# Patient Record
Sex: FEMALE | Race: ASIAN | NOT HISPANIC OR LATINO | Employment: FULL TIME | URBAN - METROPOLITAN AREA
[De-identification: names, ages, dates, MRNs, and addresses within clinical notes are randomized per-mention and may not be internally consistent; named-entity substitution may affect disease eponyms.]

---

## 2018-01-19 ENCOUNTER — RECORDS - HEALTHEAST (OUTPATIENT)
Dept: LAB | Facility: CLINIC | Age: 28
End: 2018-01-19

## 2018-01-19 LAB
BASOPHILS # BLD AUTO: 0 THOU/UL (ref 0–0.2)
BASOPHILS NFR BLD AUTO: 0 % (ref 0–2)
EOSINOPHIL # BLD AUTO: 0.2 THOU/UL (ref 0–0.4)
EOSINOPHIL NFR BLD AUTO: 2 % (ref 0–6)
ERYTHROCYTE [DISTWIDTH] IN BLOOD BY AUTOMATED COUNT: 13.3 % (ref 11–14.5)
HCT VFR BLD AUTO: 32.5 % (ref 35–47)
HGB BLD-MCNC: 11.1 G/DL (ref 12–16)
HIV 1+2 AB+HIV1 P24 AG SERPL QL IA: NEGATIVE
LYMPHOCYTES # BLD AUTO: 2.2 THOU/UL (ref 0.8–4.4)
LYMPHOCYTES NFR BLD AUTO: 22 % (ref 20–40)
MCH RBC QN AUTO: 30.2 PG (ref 27–34)
MCHC RBC AUTO-ENTMCNC: 34.2 G/DL (ref 32–36)
MCV RBC AUTO: 89 FL (ref 80–100)
MONOCYTES # BLD AUTO: 0.4 THOU/UL (ref 0–0.9)
MONOCYTES NFR BLD AUTO: 4 % (ref 2–10)
NEUTROPHILS # BLD AUTO: 7.1 THOU/UL (ref 2–7.7)
NEUTROPHILS NFR BLD AUTO: 71 % (ref 50–70)
PLATELET # BLD AUTO: 246 THOU/UL (ref 140–440)
PMV BLD AUTO: 11.1 FL (ref 8.5–12.5)
RBC # BLD AUTO: 3.67 MILL/UL (ref 3.8–5.4)
WBC: 10.1 THOU/UL (ref 4–11)

## 2018-01-20 LAB
ANTIBODY SCREEN: NEGATIVE
BACTERIA SPEC CULT: NO GROWTH
HBV SURFACE AG SERPL QL IA: NEGATIVE

## 2018-01-21 ENCOUNTER — HEALTH MAINTENANCE LETTER (OUTPATIENT)
Age: 28
End: 2018-01-21

## 2018-01-22 LAB
ABO/RH(D): NORMAL
ABORH REPEAT: NORMAL
C TRACH DNA SPEC QL PROBE+SIG AMP: NEGATIVE
N GONORRHOEA DNA SPEC QL NAA+PROBE: NEGATIVE
RUBV IGG SERPL QL IA: NORMAL
SYPHILIS RPR SCREEN - HISTORICAL: NORMAL

## 2018-03-12 ENCOUNTER — RECORDS - HEALTHEAST (OUTPATIENT)
Dept: ADMINISTRATIVE | Facility: OTHER | Age: 28
End: 2018-03-12

## 2018-05-09 ENCOUNTER — RECORDS - HEALTHEAST (OUTPATIENT)
Dept: LAB | Facility: CLINIC | Age: 28
End: 2018-05-09

## 2018-05-10 LAB
ALLERGIC TO PENICILLIN: NO
GP B STREP DNA SPEC QL NAA+PROBE: NEGATIVE

## 2020-08-04 ENCOUNTER — COMMUNICATION - HEALTHEAST (OUTPATIENT)
Dept: SCHEDULING | Facility: CLINIC | Age: 30
End: 2020-08-04

## 2021-06-03 ENCOUNTER — RECORDS - HEALTHEAST (OUTPATIENT)
Dept: ADMINISTRATIVE | Facility: CLINIC | Age: 31
End: 2021-06-03

## 2021-07-14 PROBLEM — Z34.90 PREGNANT: Status: RESOLVED | Noted: 2018-05-31 | Resolved: 2018-06-01

## 2022-11-14 ENCOUNTER — LAB REQUISITION (OUTPATIENT)
Dept: LAB | Facility: CLINIC | Age: 32
End: 2022-11-14

## 2022-11-14 DIAGNOSIS — Z00.00 ENCOUNTER FOR GENERAL ADULT MEDICAL EXAMINATION WITHOUT ABNORMAL FINDINGS: ICD-10-CM

## 2022-11-14 DIAGNOSIS — Z01.419 ENCOUNTER FOR GYNECOLOGICAL EXAMINATION (GENERAL) (ROUTINE) WITHOUT ABNORMAL FINDINGS: ICD-10-CM

## 2022-11-14 PROCEDURE — 80061 LIPID PANEL: CPT | Performed by: FAMILY MEDICINE

## 2022-11-14 PROCEDURE — 87624 HPV HI-RISK TYP POOLED RSLT: CPT | Performed by: FAMILY MEDICINE

## 2022-11-14 PROCEDURE — G0145 SCR C/V CYTO,THINLAYER,RESCR: HCPCS | Performed by: FAMILY MEDICINE

## 2022-11-15 LAB
CHOLEST SERPL-MCNC: 142 MG/DL
HDLC SERPL-MCNC: 52 MG/DL
LDLC SERPL CALC-MCNC: 80 MG/DL
NONHDLC SERPL-MCNC: 90 MG/DL
TRIGL SERPL-MCNC: 51 MG/DL

## 2022-11-17 LAB
BKR LAB AP GYN ADEQUACY: NORMAL
BKR LAB AP GYN INTERPRETATION: NORMAL
BKR LAB AP HPV REFLEX: NORMAL
BKR LAB AP LMP: NORMAL
BKR LAB AP PREVIOUS ABNORMAL: NORMAL
PATH REPORT.COMMENTS IMP SPEC: NORMAL
PATH REPORT.COMMENTS IMP SPEC: NORMAL
PATH REPORT.RELEVANT HX SPEC: NORMAL

## 2022-11-21 LAB
HUMAN PAPILLOMA VIRUS 16 DNA: NEGATIVE
HUMAN PAPILLOMA VIRUS 18 DNA: NEGATIVE
HUMAN PAPILLOMA VIRUS FINAL DIAGNOSIS: NORMAL
HUMAN PAPILLOMA VIRUS OTHER HR: NEGATIVE

## 2023-03-22 ENCOUNTER — LAB REQUISITION (OUTPATIENT)
Dept: LAB | Facility: CLINIC | Age: 33
End: 2023-03-22

## 2023-03-22 DIAGNOSIS — O20.0 THREATENED ABORTION: ICD-10-CM

## 2023-03-22 LAB — HCG INTACT+B SERPL-ACNC: 7 MIU/ML

## 2023-03-22 PROCEDURE — 84702 CHORIONIC GONADOTROPIN TEST: CPT | Performed by: NURSE PRACTITIONER

## 2024-06-11 ENCOUNTER — HOSPITAL ENCOUNTER (EMERGENCY)
Facility: HOSPITAL | Age: 34
Discharge: HOME | End: 2024-06-11
Attending: EMERGENCY MEDICINE | Admitting: EMERGENCY MEDICINE
Payer: COMMERCIAL

## 2024-06-11 VITALS
WEIGHT: 128.8 LBS | TEMPERATURE: 98.1 F | OXYGEN SATURATION: 100 % | DIASTOLIC BLOOD PRESSURE: 62 MMHG | HEIGHT: 62 IN | SYSTOLIC BLOOD PRESSURE: 98 MMHG | HEART RATE: 58 BPM | BODY MASS INDEX: 23.7 KG/M2 | RESPIRATION RATE: 18 BRPM

## 2024-06-11 DIAGNOSIS — L29.9 ITCHING: ICD-10-CM

## 2024-06-11 PROCEDURE — 250N000012 HC RX MED GY IP 250 OP 636 PS 637: Performed by: EMERGENCY MEDICINE

## 2024-06-11 PROCEDURE — 99283 EMERGENCY DEPT VISIT LOW MDM: CPT

## 2024-06-11 RX ORDER — PREDNISONE 20 MG/1
40 TABLET ORAL ONCE
Status: COMPLETED | OUTPATIENT
Start: 2024-06-11 | End: 2024-06-11

## 2024-06-11 RX ORDER — PREDNISONE 20 MG/1
40 TABLET ORAL DAILY
Qty: 8 TABLET | Refills: 0 | Status: SHIPPED | OUTPATIENT
Start: 2024-06-11 | End: 2024-06-15

## 2024-06-11 RX ADMIN — PREDNISONE 40 MG: 20 TABLET ORAL at 21:20

## 2024-06-11 ASSESSMENT — COLUMBIA-SUICIDE SEVERITY RATING SCALE - C-SSRS
2. HAVE YOU ACTUALLY HAD ANY THOUGHTS OF KILLING YOURSELF IN THE PAST MONTH?: NO
6. HAVE YOU EVER DONE ANYTHING, STARTED TO DO ANYTHING, OR PREPARED TO DO ANYTHING TO END YOUR LIFE?: NO
1. IN THE PAST MONTH, HAVE YOU WISHED YOU WERE DEAD OR WISHED YOU COULD GO TO SLEEP AND NOT WAKE UP?: NO

## 2024-06-11 ASSESSMENT — ACTIVITIES OF DAILY LIVING (ADL): ADLS_ACUITY_SCORE: 33

## 2024-06-12 NOTE — ED PROVIDER NOTES
EMERGENCY DEPARTMENT ENCOUnter      NAME: Keagan Locke  AGE: 33 year old female  YOB: 1990  MRN: [id number]  EVALUATION DATE & TIME: 2024  8:37 PM    PCP: Rahel Shoemaker    ED PROVIDER: Theron Lopez DO      Chief Complaint   Patient presents with    Rash         FINAL IMPRESSION:  1. Itching          ED COURSE & MEDICAL DECISION MAKIN::55 PM I met with patient for initial interview and encounter. We also discussed plan for discharge at this time as well.     The patient presented to the emergency department today complaining of facial itching.  She is not sure what may have triggered this.  She denies any other symptoms.  No respiratory symptoms.  On exam, she has very faint erythema across the face.  No symptoms to suggest infection.  She has tried Benadryl without much help at home.  She was given a dose of prednisone here and I will prescribe additional prednisone to take over the next few days.  I have recommended that she follow-up closely with her primary care doctor on an outpatient basis.  She is comfortable with this plan.    Medical Decision Making  Obtained supplemental history:Supplemental history obtained?: No  Reviewed external records: External records reviewed?: No  Care impacted by chronic illness:N/A  Care significantly affected by social determinants of health:Access to Medical Care  Did you consider but not order tests?: Work up considered but not performed and documented in chart, if applicable  Did you interpret images independently?: Independent interpretation of ECG and images noted in documentation, when applicable.  Consultation discussion with other provider:Did you involve another provider (consultant, MH, pharmacy, etc.)?: No  Discharge. I prescribed additional prescription strength medication(s) as charted. See documentation for any additional details.    At the conclusion of the encounter I discussed the results of all of the tests and the disposition. The  questions were answered. The patient or family acknowledged understanding and was agreeable with the care plan.     MEDICATIONS GIVEN IN THE EMERGENCY:  Medications   predniSONE (DELTASONE) tablet 40 mg (40 mg Oral $Given 6/11/24 2120)     NEW PRESCRIPTIONS STARTED AT TODAY'S ER VISIT  Discharge Medication List as of 6/11/2024  9:08 PM        START taking these medications    Details   predniSONE (DELTASONE) 20 MG tablet Take 2 tablets (40 mg) by mouth daily for 4 days Take two tablets (= 40mg) each day for 4 (four) days, Disp-8 tablet, R-0, Local Print           =================================================================    HPI  Keagan Locke is a 33 year old female with no known pertinent medical history who presents to this ED by private car for evaluation of facial itchiness.     Patient reports a week long history of facial pruritus that initially began at a pimple. Pimple has disappeared, however, her itchiness has persisted. No other medical complaints at this time including fever or shortness of breath. Attempted an OTC allergy medication plus Benadryl without relief, last dose of latter around 1500 earlier today. No new exposures including foods, soaps, or detergents. Unfortunately, attempted to schedule visit with primary, but their appointments were too far out.     PAST MEDICAL HISTORY:  Past Medical History:   Diagnosis Date    Pregnancy     4 children    Vaginal candidiasis 10/19/2013    2nd episode noted 10/18/13      PAST SURGICAL HISTORY:  Past Surgical History:   Procedure Laterality Date    APPENDECTOMY      APPENDECTOMY       CURRENT MEDICATIONS:    predniSONE (DELTASONE) 20 MG tablet  hydrocortisone (CORTAID) 1 % cream      ALLERGIES:  Allergies   Allergen Reactions    No Known Drug Allergy        FAMILY HISTORY:  Family History   Problem Relation Age of Onset    Diabetes No family hx of     Hypertension No family hx of     Coronary Artery Disease No family hx of     Breast Cancer No family hx  "of     Colon Cancer No family hx of     Prostate Cancer No family hx of     Other Cancer No family hx of        SOCIAL HISTORY:   Social History     Socioeconomic History    Marital status:    Tobacco Use    Smoking status: Never    Smokeless tobacco: Never   Substance and Sexual Activity    Alcohol use: No    Drug use: No    Sexual activity: Yes     Partners: Male     Birth control/protection: None     Comment:  is only partner     VITALS:  Patient Vitals for the past 24 hrs:   BP Temp Temp src Pulse Resp SpO2 Height Weight   06/11/24 2115 98/62 -- -- 58 -- 100 % -- --   06/11/24 2018 110/70 98.1  F (36.7  C) Oral 68 18 100 % 1.575 m (5' 2\") 58.4 kg (128 lb 12.8 oz)     PHYSICAL EXAM    VITAL SIGNS: BP 98/62   Pulse 58   Temp 98.1  F (36.7  C) (Oral)   Resp 18   Ht 1.575 m (5' 2\")   Wt 58.4 kg (128 lb 12.8 oz)   SpO2 100%   BMI 23.56 kg/m     Constitutional:  Well developed, Well nourished,  HENT:  Normocephalic, Atraumatic, Oropharynx moist, Nose normal.   Neck:  Normal range of motion, Supple, No stridor.   Eyes:  EOMI, Conjunctiva normal, No discharge.   Respiratory:  Breathing comfortably, No respiratory distress.    Cardiovascular:  Normal pulses.  Musculoskeletal:  No edema or cyanosis, no deformities  Integument:  Dry, faint erythema across the face, no specific lesions identified.   Neurologic:  Alert & oriented x 3, No obvious focal deficits noted.   Psychiatric:  Affect normal, Judgment normal, Mood normal.        I, Miguel Ángel Sauer, am serving as a scribe to document services personally performed by Dr. Lopez based on my observation and the provider's statements to me. I, Theron Lopez, DO attest that Miguel Ángel Sauer is acting in a scribe capacity, has observed my performance of the services and has documented them in accordance with my direction.    Theron Lopez, DO  Emergency Medicine  [facility] EMERGENCY DEPARTMENT  [address] " [facility] [id number]  [phone]  Dept: [phone]     Theron Lpoez,   06/11/24 232

## 2024-06-12 NOTE — DISCHARGE INSTRUCTIONS
It is unclear what triggered your itching today.  Take the prescribed steroid medicine as directed.  You can also use over-the-counter hydrocortisone cream to see if this helps with your itching.  Follow-up closely with your primary care doctor and return to the ER for any worsening symptoms or other concerns.

## 2024-06-12 NOTE — ED TRIAGE NOTES
Reports a facial rash that is itchy ongoing for 1 week.     Triage Assessment (Adult)       Row Name 06/11/24 2019          Triage Assessment    Airway WDL WDL        Respiratory WDL    Respiratory WDL WDL        Skin Circulation/Temperature WDL    Skin Circulation/Temperature WDL X  rash        Cardiac WDL    Cardiac WDL WDL        Peripheral/Neurovascular WDL    Peripheral Neurovascular WDL WDL        Cognitive/Neuro/Behavioral WDL    Cognitive/Neuro/Behavioral WDL WDL